# Patient Record
Sex: FEMALE | URBAN - METROPOLITAN AREA
[De-identification: names, ages, dates, MRNs, and addresses within clinical notes are randomized per-mention and may not be internally consistent; named-entity substitution may affect disease eponyms.]

---

## 2018-01-01 ENCOUNTER — OUTPATIENT (OUTPATIENT)
Dept: OUTPATIENT SERVICES | Facility: HOSPITAL | Age: 43
LOS: 1 days | End: 2018-01-01
Payer: MEDICAID

## 2018-01-01 ENCOUNTER — EMERGENCY (EMERGENCY)
Facility: HOSPITAL | Age: 43
LOS: 1 days | Discharge: HOME | End: 2018-01-01
Attending: EMERGENCY MEDICINE
Payer: MEDICAID

## 2018-01-01 VITALS
OXYGEN SATURATION: 99 % | HEART RATE: 94 BPM | TEMPERATURE: 99 F | WEIGHT: 145.06 LBS | SYSTOLIC BLOOD PRESSURE: 143 MMHG | HEIGHT: 63 IN | DIASTOLIC BLOOD PRESSURE: 83 MMHG | RESPIRATION RATE: 16 BRPM

## 2018-01-01 VITALS
SYSTOLIC BLOOD PRESSURE: 139 MMHG | HEART RATE: 95 BPM | DIASTOLIC BLOOD PRESSURE: 80 MMHG | OXYGEN SATURATION: 99 % | TEMPERATURE: 99 F | RESPIRATION RATE: 18 BRPM

## 2018-01-01 LAB
APPEARANCE UR: CLEAR — SIGNIFICANT CHANGE UP
BILIRUB UR-MCNC: NEGATIVE — SIGNIFICANT CHANGE UP
COLOR SPEC: YELLOW — SIGNIFICANT CHANGE UP
DIFF PNL FLD: ABNORMAL
GLUCOSE UR QL: NEGATIVE — SIGNIFICANT CHANGE UP
KETONES UR-MCNC: NEGATIVE — SIGNIFICANT CHANGE UP
LEUKOCYTE ESTERASE UR-ACNC: ABNORMAL
NITRITE UR-MCNC: NEGATIVE — SIGNIFICANT CHANGE UP
PH UR: 6.5 — SIGNIFICANT CHANGE UP (ref 5–8)
PROT UR-MCNC: NEGATIVE — SIGNIFICANT CHANGE UP
SP GR SPEC: 1 — LOW (ref 1.01–1.02)
UROBILINOGEN FLD QL: NEGATIVE — SIGNIFICANT CHANGE UP

## 2018-01-01 PROCEDURE — 87086 URINE CULTURE/COLONY COUNT: CPT

## 2018-01-01 PROCEDURE — 99284 EMERGENCY DEPT VISIT MOD MDM: CPT

## 2018-01-01 PROCEDURE — 87186 SC STD MICRODIL/AGAR DIL: CPT

## 2018-01-01 PROCEDURE — 99283 EMERGENCY DEPT VISIT LOW MDM: CPT

## 2018-01-01 PROCEDURE — G9001: CPT

## 2018-01-01 PROCEDURE — 81001 URINALYSIS AUTO W/SCOPE: CPT

## 2018-01-01 RX ORDER — CEFUROXIME AXETIL 250 MG
1 TABLET ORAL
Qty: 14 | Refills: 0 | OUTPATIENT
Start: 2018-01-01 | End: 2018-01-07

## 2018-01-01 RX ORDER — PHENAZOPYRIDINE HCL 100 MG
1 TABLET ORAL
Qty: 6 | Refills: 0 | OUTPATIENT
Start: 2018-01-01 | End: 2018-01-02

## 2018-01-01 RX ORDER — CEFUROXIME AXETIL 250 MG
500 TABLET ORAL ONCE
Qty: 0 | Refills: 0 | Status: COMPLETED | OUTPATIENT
Start: 2018-01-01 | End: 2018-01-01

## 2018-01-01 RX ADMIN — Medication 500 MILLIGRAM(S): at 21:01

## 2018-01-01 NOTE — ED PROVIDER NOTE - MEDICAL DECISION MAKING DETAILS
Pt with hemorrhagic cystitis. Pt is well appearing walking with normal gait, stable for discharge and follow up with medical doctor. Pt educated on care and need for follow up. Discussed anticipatory guidance and return precautions. Questions answered. I had a detailed discussion with the patient and/or guardian regarding the historical points, exam findings, and any diagnostic results supporting the discharge diagnosis. Rx Ceftin and pyridium.

## 2018-01-01 NOTE — ED ADULT NURSE NOTE - OBJECTIVE STATEMENT
Pt. is c/o abdominal pain that started Thursday. Pt. stated it went away and came back today. Pt. denies any n/v/d

## 2018-01-01 NOTE — ED PROVIDER NOTE - OBJECTIVE STATEMENT
CC dysuria, suprapubic tenderness, increased frequency x 2 day. No vaginal bleeding, no fever, no vomiting, no back pain.

## 2018-01-02 DIAGNOSIS — R69 ILLNESS, UNSPECIFIED: ICD-10-CM

## 2018-01-14 NOTE — ED PROVIDER NOTE - ENDOCRINE NEGATIVE STATEMENT, MLM
Patient is a 52 yo female with a pmhx of asthma and extensive alcohol hx who is here presenting from [facility] with cough, dyspnea on exertion and lethargy, admitted for sepsis 2/2 CAP. no diabetes and no thyroid trouble.

## 2018-05-01 ENCOUNTER — OUTPATIENT (OUTPATIENT)
Dept: OUTPATIENT SERVICES | Facility: HOSPITAL | Age: 43
LOS: 1 days | End: 2018-05-01
Payer: MEDICAID

## 2018-05-01 PROCEDURE — G9001: CPT

## 2018-05-02 DIAGNOSIS — R69 ILLNESS, UNSPECIFIED: ICD-10-CM
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
## 2025-02-20 ENCOUNTER — EMERGENCY (EMERGENCY)
Facility: HOSPITAL | Age: 50
LOS: 1 days | Discharge: HOME | End: 2025-02-20
Attending: EMERGENCY MEDICINE

## 2025-02-20 VITALS
SYSTOLIC BLOOD PRESSURE: 148 MMHG | OXYGEN SATURATION: 99 % | HEIGHT: 63 IN | HEART RATE: 89 BPM | DIASTOLIC BLOOD PRESSURE: 83 MMHG | RESPIRATION RATE: 18 BRPM | WEIGHT: 147.05 LBS | TEMPERATURE: 98 F

## 2025-02-20 PROCEDURE — 99283 EMERGENCY DEPT VISIT LOW MDM: CPT

## 2025-02-20 PROCEDURE — 99284 EMERGENCY DEPT VISIT MOD MDM: CPT

## 2025-02-20 RX ORDER — AMOXICILLIN AND CLAVULANATE POTASSIUM 200; 28.5 MG/5ML; MG/5ML
875 POWDER, FOR SUSPENSION ORAL
Qty: 14 | Refills: 0
Start: 2025-02-20 | End: 2025-02-26

## 2025-02-20 RX ORDER — AMOXICILLIN AND CLAVULANATE POTASSIUM 200; 28.5 MG/5ML; MG/5ML
1 POWDER, FOR SUSPENSION ORAL ONCE
Refills: 0 | Status: COMPLETED | OUTPATIENT
Start: 2025-02-20 | End: 2025-02-20

## 2025-02-20 RX ORDER — CLOSTRIDIUM TETANI TOXOID ANTIGEN (FORMALDEHYDE INACTIVATED), CORYNEBACTERIUM DIPHTHERIAE TOXOID ANTIGEN (FORMALDEHYDE INACTIVATED), BORDETELLA PERTUSSIS TOXOID ANTIGEN (GLUTARALDEHYDE INACTIVATED), BORDETELLA PERTUSSIS FILAMENTOUS HEMAGGLUTININ ANTIGEN (FORMALDEHYDE INACTIVATED), BORDETELLA PERTUSSIS PERTACTIN ANTIGEN, AND BORDETELLA PERTUSSIS FIMBRIAE 2/3 ANTIGEN 5; 2; 2.5; 5; 3; 5 [LF]/.5ML; [LF]/.5ML; UG/.5ML; UG/.5ML; UG/.5ML; UG/.5ML
0.5 INJECTION, SUSPENSION INTRAMUSCULAR ONCE
Refills: 0 | Status: DISCONTINUED | OUTPATIENT
Start: 2025-02-20 | End: 2025-02-20

## 2025-02-20 RX ADMIN — AMOXICILLIN AND CLAVULANATE POTASSIUM 1 TABLET(S): 200; 28.5 POWDER, FOR SUSPENSION ORAL at 14:22

## 2025-02-20 NOTE — ED PROVIDER NOTE - PHYSICAL EXAMINATION
Annual eye exam    Recheck PSA    Check A1C, BMP (potassium)    Recheck on the thyroid (TSH)    Check Valproic Acid level    Fasting lipids in Nov 2022   CONSTITUTIONAL: A&O x3, NAD, resting comfortably, well groomed  HEAD: Normocephalic, atraumatic.  NECK:  Airway patent. Neck Supple.   RESPIRATORY: breathing unlabored.   PERIPHERAL VASCULAR: No edema of feet and ankles. No calf tenderness. Pulses 2+ B/L.   MSK: Moving all extremities. Full ROM and Strength 5+/5+ B/L upper and lower extremities. Soft compartments LLE.   SKIN: left posterior ankle 2cm superficial abrasion, no active bleeding, no surrounding erythema, calor, ecchymosis, crepitus. Cap refill < 2 sec.  NEURO: A&Ox3, interactive, cooperative, no focal deficits. No paresthesias. CONSTITUTIONAL: A&O x3, NAD, resting comfortably, well groomed  HEAD: Normocephalic, atraumatic.  NECK:  Airway patent. Neck Supple.   RESPIRATORY: breathing unlabored.   PERIPHERAL VASCULAR: No edema of feet and ankles. No calf tenderness. Pulses 2+ B/L.   MSK: Moving all extremities. Full ROM and Strength 5+/5+ B/L upper and lower extremities. Soft compartments LLE.   SKIN: left posterior ankle 2cm superficial abrasion, no active bleeding, no surrounding erythema, calor, ecchymosis, crepitus, edema. Cap refill < 2 sec.  NEURO: A&Ox3, interactive, cooperative, no focal deficits. No paresthesias.

## 2025-02-20 NOTE — ED PROVIDER NOTE - CLINICAL SUMMARY MEDICAL DECISION MAKING FREE TEXT BOX
CAMILA Mullins NP Fellow: Patient is a 49-year-old female who denies significant past medical history presents to the emergency room after dog bite inside her apartment complex today.  Patient reports woman was walking with her you working on him on the ground hit the back of her left ankle.  Patient reports she was able to get in contact with the  who contacted the woman with the dog who endorses that the dog is up-to-date on all vaccines.  Patient denies leg, ankle, foot pain in affected area.  Tdap vaccine updated within the last year.  Patient is well-appearing, nontoxic, in no apparent distress.  Physical exam as above pertinent for 2 cm superficial abrasion to posterior left ankle no surrounding erythema, calor, ecchymosis, crepitus, edema. Wound cleaned with normal saline, chlorhexidine, covered with gauze and tape.   Patient likely with dog bite.  Little concerning for rabies exposure in the setting of dog is up-to-date on vaccines.  Little concern for other MSK injury in the setting of normal ROM of left ankle patient is full weightbearing without limp, no edema.  Per shared decision making discussion with patient of pros vs cons of rabies vaccine and immunoglobulin, patient declined at this time.    Patient to be discharged home with PMD follow-up and strict return precautions.  Patient educated to look for signs of infection to wound, educated to apply bacitracin 2 times daily to prevent infection.  Rx sent to patient's pharmacy for Augmentin to prevent infection.   Patient aware of and agreement with plan of care, provided time to ask questions all which were answered at the bedside by me.

## 2025-02-20 NOTE — ED ADULT NURSE NOTE - SUICIDE SCREENING QUESTION 1
Pt called requesting to speak with a nurse regarding US results. Pt states that she is frustrated that she has not heard back yet.   No

## 2025-02-20 NOTE — ED PROVIDER NOTE - NSFOLLOWUPINSTRUCTIONS_ED_ALL_ED_FT
- You were seen in the ER today for dog bite to left ankle.    - A prescription was sent to your pharmacy for Augmentin, an antibiotic, please take as directed.    - Please apply Bacitracin twice daily to the wound to prevent infection.     - The dog in question should be monitored for one abnormal behavior for the next week.     - Please follow up with your primary care doctor in the next 48-72 hours, bring a copy of your results.     - Patient return to the ER for redness or swelling around site of bite, red streak up leg or down foot, purulent discharge, fevers, severe muscle spasms, change in behavior or mental status, or any other concerns.

## 2025-02-20 NOTE — ED PROVIDER NOTE - PATIENT PORTAL LINK FT
You can access the FollowMyHealth Patient Portal offered by [facility] by registering at the following website: [email or web address]. By joining [email or web address]’s FollowMyHealth portal, you will also be able to view your health information using other applications (apps) compatible with our system.